# Patient Record
Sex: FEMALE | Race: WHITE | NOT HISPANIC OR LATINO | Employment: OTHER | URBAN - METROPOLITAN AREA
[De-identification: names, ages, dates, MRNs, and addresses within clinical notes are randomized per-mention and may not be internally consistent; named-entity substitution may affect disease eponyms.]

---

## 2019-11-26 ENCOUNTER — OFFICE VISIT (OUTPATIENT)
Dept: NEUROSURGERY | Facility: CLINIC | Age: 74
End: 2019-11-26

## 2019-11-26 VITALS
HEIGHT: 67 IN | WEIGHT: 180 LBS | BODY MASS INDEX: 28.25 KG/M2 | DIASTOLIC BLOOD PRESSURE: 76 MMHG | SYSTOLIC BLOOD PRESSURE: 138 MMHG

## 2019-11-26 DIAGNOSIS — R09.89 OTHER SPECIFIED SYMPTOMS AND SIGNS INVOLVING THE CIRCULATORY AND RESPIRATORY SYSTEMS: ICD-10-CM

## 2019-11-26 DIAGNOSIS — M48.062 SPINAL STENOSIS OF LUMBAR REGION WITH NEUROGENIC CLAUDICATION: ICD-10-CM

## 2019-11-26 DIAGNOSIS — M43.10 ACQUIRED SPONDYLOLISTHESIS: Primary | ICD-10-CM

## 2019-11-26 DIAGNOSIS — R20.0 BILATERAL LEG NUMBNESS: ICD-10-CM

## 2019-11-26 PROCEDURE — 99203 OFFICE O/P NEW LOW 30 MIN: CPT | Performed by: NEUROLOGICAL SURGERY

## 2019-11-26 RX ORDER — ATORVASTATIN CALCIUM 10 MG/1
TABLET, FILM COATED ORAL
COMMUNITY
Start: 2019-10-04

## 2019-11-26 RX ORDER — PROPRANOLOL HYDROCHLORIDE 10 MG/1
TABLET ORAL
COMMUNITY
Start: 2019-10-04

## 2019-11-26 RX ORDER — FEXOFENADINE HCL 180 MG/1
TABLET ORAL
COMMUNITY
Start: 2019-10-04

## 2019-11-26 RX ORDER — FLUTICASONE PROPIONATE 50 MCG
SPRAY, SUSPENSION (ML) NASAL
COMMUNITY
Start: 2019-10-04

## 2019-11-26 RX ORDER — HYDROCHLOROTHIAZIDE 12.5 MG/1
12.5 CAPSULE, GELATIN COATED ORAL DAILY
Refills: 5 | COMMUNITY
Start: 2019-11-22

## 2019-11-26 RX ORDER — UBIDECARENONE 100 MG
100 CAPSULE ORAL DAILY
COMMUNITY

## 2019-11-26 NOTE — PROGRESS NOTES
Desiree Rivera  1945  [id number]      Chief Complaint   Patient presents with   • Numbness     mri       HISTORY OF PRESENT ILLNESS: Is a 74-year-old female who presents with approximately 6-week history of numbness in her lower extremities unassociated with claudication.  She has numbness in the inguinal region and in both lower extremities.  She feels as if she is walking on platforms; when she sits as if she is sitting on a cushion.  She has no bowel or bladder issues.  No incontinence.  She has minimal pain in her lumbar region.  She has no true radiculopathy.  Her symptoms seem to be on influence by any type of activity such as bending twisting and lifting.  A lumbar MRI was been performed and she is referred for neurosurgical consultation.    Past Medical History:   Diagnosis Date   • Anxiety    • Depression    • Essential tremor    • Hyperglycemia    • Hyperlipidemia    • Hypertension    • Neck pain    • Osteopenia        Past Surgical History:   Procedure Laterality Date   • TONSILLECTOMY     • TUBAL ABDOMINAL LIGATION         Family History   Problem Relation Age of Onset   • Arthritis Mother    • Heart failure Mother    • Heart failure Father        Social History     Socioeconomic History   • Marital status:      Spouse name: Not on file   • Number of children: Not on file   • Years of education: Not on file   • Highest education level: Not on file   Tobacco Use   • Smoking status: Former Smoker     Last attempt to quit:      Years since quittin.9   Substance and Sexual Activity   • Alcohol use: No     Frequency: Never   • Sexual activity: Defer       Allergies no known allergies      Current Outpatient Medications:   •  atorvastatin (LIPITOR) 10 MG tablet, , Disp: , Rfl:   •  Calcium Carb-Cholecalciferol (CALCIUM/VITAMIN D) 500-200 MG-UNIT tablet, Take  by mouth., Disp: , Rfl:   •  coenzyme Q10 100 MG capsule, Take 100 mg by mouth Daily., Disp: , Rfl:   •  fexofenadine (ALLEGRA) 180 MG  "tablet, , Disp: , Rfl:   •  fluticasone (FLONASE) 50 MCG/ACT nasal spray, , Disp: , Rfl:   •  hydroCHLOROthiazide (MICROZIDE) 12.5 MG capsule, Take 12.5 mg by mouth Daily., Disp: , Rfl: 5  •  propranolol (INDERAL) 10 MG tablet, , Disp: , Rfl:   •  sertraline (ZOLOFT) 50 MG tablet, , Disp: , Rfl:     Review of Systems   Constitutional: Negative.    HENT: Negative.    Eyes: Negative.    Respiratory: Negative.    Cardiovascular: Negative.    Gastrointestinal: Negative.    Endocrine: Negative.    Genitourinary: Negative.    Musculoskeletal: Positive for arthralgias and back pain.   Skin: Negative.    Allergic/Immunologic: Positive for environmental allergies.   Neurological: Positive for numbness.   Psychiatric/Behavioral: The patient is nervous/anxious.        Vitals:    11/26/19 1139   BP: 138/76   BP Location: Left arm   Patient Position: Sitting   Weight: 81.6 kg (180 lb)   Height: 170.2 cm (67\")       Neurological Examination:    Mental status/speech: The patient is alert and oriented.  Speech is clear without aphysia or dysarthria.  No overt cognitive deficits.    Cranial nerve examination:    Olfaction: Smell is intact.  Vision: Vision is intact without visual field abnormalities.  Funduscopic examination is normal.  No pupillary irregularity.  Ocular motor examination: The extraocular muscles are intact.  There is no diplopia.  The pupil is round and reactive to both light and accommodation.  There is no nystagmus.  Facial movement/sensation: There is no facial weakness.  Sensation is intact in the first, second, and third divisions of the trigeminal nerve.  The corneal reflex is intact.  Auditory: Hearing is intact to finger rub bilaterally.  Cranial nerves IX, X, XI, XII: Phonation is normal.  No dysphagia.  Tongue is protruded in the midline without atrophy.  The gag reflex is intact.  Shoulder shrug is normal.    Musculoligamentous ligamentous examination: Straight leg raising, Lasègue and flip test are " negative.  There is no Babinski, Arleen or clonus.  She has spotty sensory loss in both lower extremities.  She is areflexic.  Her strength is intact however.  Gait seems to be a bit unsteady.      Medical Decision Making:     Diagnostic Data Set: The lumbar MRI shows an acquired spondylolisthesis L3-L4 with mild spinal stenosis at L3-L4 and L4-L5.      Assessment: Peripheral neuropathy MI: Spinal stenosis; rule out multiple sclerosis          Recommendations: Although she has spinal stenosis in her lumbar region it is not as severe as I would anticipate to present symptoms that she is experiencing.  Furthermore she has no pain or claudication which makes cauda equina compression most unlikely.  However, she is areflexic therefore a peripheral neuropathy needs to be at least considered as a option.  The other option is that of disseminated sclerosis.  I have ordered EMG/NCV; MRI of the brain cervical and thoracic cord and will see her subsequently.  She will have her EMG/NCV performed by Dr. Hayes.  I am sure he will weigh in on this as well.  We will follow through this and keep you informed.        I greatly appreciate the opportunity to see and evaluate this individual.  If you have questions or concerns regarding issues that I may have overlooked please call me at any time: [phone].  Frank Gordon M.D.  [facility]  [address]  [place] [address]    Scribed for Cyril Gordon MD by Clair Gipson CMA. 11/26/2019 12:16 PM     I have read and concur with the information provided by the scribe.  Cyril Gordon MD

## 2019-12-06 ENCOUNTER — APPOINTMENT (OUTPATIENT)
Dept: MRI IMAGING | Facility: HOSPITAL | Age: 74
End: 2019-12-06

## 2019-12-06 ENCOUNTER — APPOINTMENT (OUTPATIENT)
Dept: CARDIOLOGY | Facility: HOSPITAL | Age: 74
End: 2019-12-06

## 2019-12-10 ENCOUNTER — HOSPITAL ENCOUNTER (OUTPATIENT)
Dept: MRI IMAGING | Facility: HOSPITAL | Age: 74
Discharge: HOME | End: 2019-12-10

## 2019-12-10 ENCOUNTER — HOSPITAL ENCOUNTER (OUTPATIENT)
Dept: CARDIOLOGY | Facility: HOSPITAL | Age: 74
Discharge: HOME | End: 2019-12-10
Admitting: NEUROLOGICAL SURGERY

## 2019-12-10 VITALS — HEIGHT: 67 IN | WEIGHT: 180 LBS | BODY MASS INDEX: 28.25 KG/M2

## 2019-12-10 DIAGNOSIS — R20.0 BILATERAL LEG NUMBNESS: ICD-10-CM

## 2019-12-10 DIAGNOSIS — R09.89 OTHER SPECIFIED SYMPTOMS AND SIGNS INVOLVING THE CIRCULATORY AND RESPIRATORY SYSTEMS: ICD-10-CM

## 2019-12-10 DIAGNOSIS — M43.10 ACQUIRED SPONDYLOLISTHESIS: ICD-10-CM

## 2019-12-10 DIAGNOSIS — M48.062 SPINAL STENOSIS OF LUMBAR REGION WITH NEUROGENIC CLAUDICATION: ICD-10-CM

## 2019-12-10 LAB
BH CV GRAFT BRACHIAL PRESSURE LEFT: 134 MMHG
BH CV GRAFT BRACHIAL PRESSURE RIGHT: 137 MMHG
BH CV LEA LEFT ANT TIBIAL A DISTAL EDV: 9.37 CM/S
BH CV LEA LEFT ANT TIBIAL A DISTAL PSV: 111 CM/S
BH CV LEA LEFT ANT TIBIAL A MID PSV: 76.1 CM/S
BH CV LEA LEFT ANT TIBIAL A PROX EDV: 9.17 CM/S
BH CV LEA LEFT ANT TIBIAL A PROX PSV: 82.1 CM/S
BH CV LEA LEFT CFA DISTAL EDV: 12.6 CM/S
BH CV LEA LEFT CFA DISTAL PSV: 138 CM/S
BH CV LEA LEFT DFA PROX EDV: 10.2 CM/S
BH CV LEA LEFT DFA PROX PSV: 111 CM/S
BH CV LEA LEFT DPA PRESSURE: 156 MMHG
BH CV LEA LEFT PERONEAL  MID PSV: 73.5 CM/S
BH CV LEA LEFT POPITEAL A  DISTAL EDV: 7.86 CM/S
BH CV LEA LEFT POPITEAL A  DISTAL PSV: 68 CM/S
BH CV LEA LEFT POPITEAL A  PROX PSV: 80.5 CM/S
BH CV LEA LEFT PTA DISTAL PSV: 77.8 CM/S
BH CV LEA LEFT PTA MID EDV: 4.32 CM/S
BH CV LEA LEFT PTA MID PSV: 61.3 CM/S
BH CV LEA LEFT PTA PRESSURE: 158 MMHG
BH CV LEA LEFT PTA PROX PSV: 54.1 CM/S
BH CV LEA LEFT SFA DISTAL EDV: 11.8 CM/S
BH CV LEA LEFT SFA DISTAL PSV: 101 CM/S
BH CV LEA LEFT SFA MID PSV: 111 CM/S
BH CV LEA LEFT SFA PROX EDV: 16.5 CM/S
BH CV LEA LEFT SFA PROX PSV: 166 CM/S
BH CV LEA RIGHT ANT TIBIAL A DISTAL PSV: 50.3 CM/S
BH CV LEA RIGHT ANT TIBIAL A MID PSV: 72.5 CM/S
BH CV LEA RIGHT ANT TIBIAL A PROX PSV: 85.1 CM/S
BH CV LEA RIGHT CFA DISTAL EDV: 11.8 CM/S
BH CV LEA RIGHT CFA DISTAL PSV: 189 CM/S
BH CV LEA RIGHT DFA PROX EDV: 9.6 CM/S
BH CV LEA RIGHT DFA PROX PSV: 92.5 CM/S
BH CV LEA RIGHT DPA PRESSURE: 160 MMHG
BH CV LEA RIGHT PERONEAL  MID PSV: 74.6 CM/S
BH CV LEA RIGHT POPITEAL A  DISTAL EDV: 6.62 CM/S
BH CV LEA RIGHT POPITEAL A  DISTAL PSV: 95.9 CM/S
BH CV LEA RIGHT POPITEAL A  PROX EDV: 6.55 CM/S
BH CV LEA RIGHT POPITEAL A  PROX PSV: 70.3 CM/S
BH CV LEA RIGHT PTA DISTAL PSV: 73.1 CM/S
BH CV LEA RIGHT PTA MID PSV: 64.3 CM/S
BH CV LEA RIGHT PTA PRESSURE: 162 MMHG
BH CV LEA RIGHT PTA PROX PSV: 74.4 CM/S
BH CV LEA RIGHT SFA DISTAL PSV: 106 CM/S
BH CV LEA RIGHT SFA MID EDV: 10.8 CM/S
BH CV LEA RIGHT SFA MID PSV: 115 CM/S
BH CV LEA RIGHT SFA PROX EDV: 16.7 CM/S
BH CV LEA RIGHT SFA PROX PSV: 165 CM/S
BH CV LOWER ARTERIAL LEFT ABI RATIO: 1.2
BH CV LOWER ARTERIAL RIGHT ABI RATIO: 1.2

## 2019-12-10 PROCEDURE — 93925 LOWER EXTREMITY STUDY: CPT

## 2019-12-10 PROCEDURE — 93925 LOWER EXTREMITY STUDY: CPT | Performed by: INTERNAL MEDICINE

## 2019-12-11 RX ORDER — DIAZEPAM 5 MG/1
5 TABLET ORAL 2 TIMES DAILY PRN
Qty: 2 TABLET | Refills: 0 | Status: SHIPPED | OUTPATIENT
Start: 2019-12-11

## 2019-12-11 NOTE — TELEPHONE ENCOUNTER
Provider:  Evan  Caller: Omid  Phone #:  [phone]  Surgery:  n/a  Surgery Date: n/a     Last visit:   11/26/19  Next visit: 12/31/19    Reason for call:       Ok to call in Valium?

## 2019-12-19 ENCOUNTER — HOSPITAL ENCOUNTER (OUTPATIENT)
Dept: MRI IMAGING | Facility: HOSPITAL | Age: 74
Discharge: HOME | End: 2019-12-19

## 2019-12-19 ENCOUNTER — HOSPITAL ENCOUNTER (OUTPATIENT)
Dept: MRI IMAGING | Facility: HOSPITAL | Age: 74
Discharge: HOME | End: 2019-12-19
Admitting: NEUROLOGICAL SURGERY

## 2019-12-19 PROCEDURE — 72141 MRI NECK SPINE W/O DYE: CPT

## 2019-12-19 PROCEDURE — 70551 MRI BRAIN STEM W/O DYE: CPT

## 2019-12-19 PROCEDURE — 72146 MRI CHEST SPINE W/O DYE: CPT

## 2019-12-31 ENCOUNTER — OFFICE VISIT (OUTPATIENT)
Dept: NEUROSURGERY | Facility: CLINIC | Age: 74
End: 2019-12-31

## 2019-12-31 VITALS
BODY MASS INDEX: 28.57 KG/M2 | DIASTOLIC BLOOD PRESSURE: 82 MMHG | TEMPERATURE: 98 F | HEIGHT: 66 IN | WEIGHT: 177.8 LBS | SYSTOLIC BLOOD PRESSURE: 148 MMHG

## 2019-12-31 DIAGNOSIS — M51.36 DEGENERATIVE DISC DISEASE, LUMBAR: ICD-10-CM

## 2019-12-31 DIAGNOSIS — R20.0 BILATERAL LEG NUMBNESS: ICD-10-CM

## 2019-12-31 DIAGNOSIS — M48.062 SPINAL STENOSIS OF LUMBAR REGION WITH NEUROGENIC CLAUDICATION: Primary | ICD-10-CM

## 2019-12-31 DIAGNOSIS — M43.10 ACQUIRED SPONDYLOLISTHESIS: ICD-10-CM

## 2019-12-31 PROCEDURE — 99213 OFFICE O/P EST LOW 20 MIN: CPT | Performed by: NEUROLOGICAL SURGERY

## 2019-12-31 NOTE — PATIENT INSTRUCTIONS
Call Dr. Gordon on a Monday or Tuesday with an update.      Ask for Ирина () and leave a message for  Dr. Gordon.     He will call you back at the end of the day as soon as he can.     [phone]

## 2019-12-31 NOTE — PROGRESS NOTES
Desiree Rivera  1945  [id number]                        CHIEF COMPLAINT:  [Follow-up from first encounter for evaluation of numbness in her right leg]         MEDICAL HISTORY SINCE LAST ENCOUNTER:  [She reports today for follow-up to review diagnostic studies prompted from her initial encounter for treatment of pain in her right leg and minor back pain.  Her symptoms are stable have not progressed.]           Past Medical History:   Diagnosis Date   • Anxiety    • Depression    • Essential tremor    • Hyperglycemia    • Hyperlipidemia    • Hypertension    • Neck pain    • Osteopenia               Past Surgical History:   Procedure Laterality Date   • TONSILLECTOMY     • TUBAL ABDOMINAL LIGATION                Family History   Problem Relation Age of Onset   • Arthritis Mother    • Heart failure Mother    • Heart failure Father               Social History     Socioeconomic History   • Marital status:      Spouse name: Not on file   • Number of children: Not on file   • Years of education: Not on file   • Highest education level: Not on file   Tobacco Use   • Smoking status: Former Smoker     Last attempt to quit:      Years since quittin.0   Substance and Sexual Activity   • Alcohol use: No     Frequency: Never   • Sexual activity: Defer            No Known Allergies           Current Outpatient Medications:   •  atorvastatin (LIPITOR) 10 MG tablet, , Disp: , Rfl:   •  Calcium Carb-Cholecalciferol (CALCIUM/VITAMIN D) 500-200 MG-UNIT tablet, Take  by mouth., Disp: , Rfl:   •  coenzyme Q10 100 MG capsule, Take 100 mg by mouth Daily., Disp: , Rfl:   •  diazePAM (VALIUM) 5 MG tablet, Take 1 tablet by mouth 2 (Two) Times a Day As Needed for Anxiety. 1 tab 30 minutes prior to MRI, 1 tab PRN, Disp: 2 tablet, Rfl: 0  •  fexofenadine (ALLEGRA) 180 MG tablet, , Disp: , Rfl:   •  fluticasone (FLONASE) 50 MCG/ACT nasal spray, , Disp: , Rfl:   •  hydroCHLOROthiazide (MICROZIDE) 12.5 MG capsule, Take 12.5 mg by  "mouth Daily., Disp: , Rfl: 5  •  propranolol (INDERAL) 10 MG tablet, , Disp: , Rfl:   •  sertraline (ZOLOFT) 50 MG tablet, , Disp: , Rfl:          Review of Systems   Constitutional: Negative.    HENT: Negative.    Eyes: Negative.    Respiratory: Negative.    Cardiovascular: Negative.    Gastrointestinal: Negative.    Endocrine: Negative.    Genitourinary: Negative.    Musculoskeletal: Positive for arthralgias and back pain.   Skin: Negative.    Allergic/Immunologic: Positive for environmental allergies.   Neurological: Positive for numbness.   Psychiatric/Behavioral: The patient is nervous/anxious.                Vitals:    12/31/19 0844   BP: 148/82   BP Location: Left arm   Patient Position: Sitting   Cuff Size: Adult   Temp: 98 °F (36.7 °C)   Weight: 80.6 kg (177 lb 12.8 oz)   Height: 167.6 cm (66\")               EXAMINATION: Unchanged in reference to weakness sensory loss or reflex asymmetry            MEDICAL DECISION MAKING: MRI of the brain is normal; vascular studies and lower extremities normal; thoracic MRI shows bulge of intervertebral disc without abnormal signal within the spinal cord; cervical MRI shows bulge of intervertebral disc with no abnormal signal within the spinal cord; EMG and NCV reportedly within normal limits           ASSESSMENT/DISPOSITION: The studies have eliminated any abnormality that would warrant require surgical intervention.  I have asked her to call me in 1 month and I will continue to monitor this.  I do not find anything ominous.  Thanks for allow me to see him.              I APPRECIATE THE OPPORTUNITY OF THIS REFERRAL. PLEASE CALL IF ANY       QUESTIONS [phone]    Scribed for Cyril Gordon MD by Clair Gipson CMA. 12/31/2019  9:16 AM    I have read and concur with the information provided by the scribe.  Cyril Gordon MD    "

## 2020-02-11 ENCOUNTER — TELEPHONE (OUTPATIENT)
Dept: NEUROSURGERY | Facility: CLINIC | Age: 75
End: 2020-02-11

## 2020-02-11 DIAGNOSIS — M48.062 SPINAL STENOSIS OF LUMBAR REGION WITH NEUROGENIC CLAUDICATION: Primary | ICD-10-CM

## 2020-02-11 NOTE — TELEPHONE ENCOUNTER
----- Message from Ирина Moran sent at 2/11/2020  3:07 PM EST -----  Contact: [phone]  PATIENT CALLING TO SAY SHE IS DOING THE SAME.

## 2021-06-03 ENCOUNTER — HOSPITAL ENCOUNTER (OUTPATIENT)
Dept: HOSPITAL 79 - EXRD | Age: 76
End: 2021-06-03
Attending: FAMILY MEDICINE
Payer: MEDICARE

## 2021-06-03 DIAGNOSIS — M85.852: ICD-10-CM

## 2021-06-03 DIAGNOSIS — M85.88: ICD-10-CM

## 2021-06-03 DIAGNOSIS — Z12.31: Primary | ICD-10-CM

## 2022-08-26 ENCOUNTER — HOSPITAL ENCOUNTER (OUTPATIENT)
Dept: HOSPITAL 79 - MAMO | Age: 77
End: 2022-08-26
Attending: FAMILY MEDICINE
Payer: MEDICARE

## 2022-08-26 DIAGNOSIS — Z12.31: Primary | ICD-10-CM
